# Patient Record
Sex: FEMALE | Race: WHITE | URBAN - METROPOLITAN AREA
[De-identification: names, ages, dates, MRNs, and addresses within clinical notes are randomized per-mention and may not be internally consistent; named-entity substitution may affect disease eponyms.]

---

## 2017-10-25 ENCOUNTER — CARE COORDINATION (OUTPATIENT)
Dept: CASE MANAGEMENT | Age: 72
End: 2017-10-25

## 2017-11-02 ENCOUNTER — CARE COORDINATION (OUTPATIENT)
Dept: MEDSURG UNIT | Age: 72
End: 2017-11-02

## 2017-11-03 ENCOUNTER — CARE COORDINATION (OUTPATIENT)
Dept: MEDSURG UNIT | Age: 72
End: 2017-11-03

## 2017-11-03 NOTE — CARE COORDINATION
[facility] Transitions Initial Follow Up Call    Call within 2 business days of discharge: Yes    Patient: Katherine Garcia Patient : 1945   MRN: <[id number]>  Reason for Admission: There are no discharge diagnoses documented for the most recent discharge. Discharge Date: 13 RARS: Dana Garcia Data Recorded         Facility: [address] [place] [address] Transitions 24 Hour Call    Care Transitions Interventions       Attempted to contact Pt for transitions call attempt #2. Contact information left to  requesting call back at the earliest convenience. Vaibhav Hudson RN BSN   Care Transitions Coordinator  [phone]   Follow Up  No future appointments.     Vaibhav Hudson RN

## 2017-11-07 ENCOUNTER — CARE COORDINATION (OUTPATIENT)
Dept: MEDSURG UNIT | Age: 72
End: 2017-11-07